# Patient Record
Sex: FEMALE | Race: WHITE
[De-identification: names, ages, dates, MRNs, and addresses within clinical notes are randomized per-mention and may not be internally consistent; named-entity substitution may affect disease eponyms.]

---

## 2019-07-23 ENCOUNTER — HOSPITAL ENCOUNTER (OUTPATIENT)
Dept: HOSPITAL 53 - M SDC | Age: 34
Discharge: HOME | End: 2019-07-23
Attending: OBSTETRICS & GYNECOLOGY
Payer: COMMERCIAL

## 2019-07-23 VITALS — SYSTOLIC BLOOD PRESSURE: 120 MMHG | DIASTOLIC BLOOD PRESSURE: 77 MMHG

## 2019-07-23 VITALS — BODY MASS INDEX: 21.34 KG/M2 | WEIGHT: 125 LBS | HEIGHT: 64 IN

## 2019-07-23 DIAGNOSIS — K58.8: ICD-10-CM

## 2019-07-23 DIAGNOSIS — F41.9: ICD-10-CM

## 2019-07-23 DIAGNOSIS — D06.9: Primary | ICD-10-CM

## 2019-07-23 LAB
B-HCG SERPL QL: NEGATIVE
HCT VFR BLD AUTO: 40 % (ref 36–47)
HGB BLD-MCNC: 12.7 G/DL (ref 12–15.5)
MCH RBC QN AUTO: 28.3 PG (ref 27–33)
MCHC RBC AUTO-ENTMCNC: 31.8 G/DL (ref 32–36.5)
MCV RBC AUTO: 89.1 FL (ref 80–96)
PLATELET # BLD AUTO: 238 10^3/UL (ref 150–450)
RBC # BLD AUTO: 4.49 10^6/UL (ref 4–5.4)
WBC # BLD AUTO: 4.1 10^3/UL (ref 4–10)

## 2019-07-23 PROCEDURE — 57522 CONIZATION OF CERVIX: CPT

## 2019-07-23 PROCEDURE — 36415 COLL VENOUS BLD VENIPUNCTURE: CPT

## 2019-07-23 PROCEDURE — 84703 CHORIONIC GONADOTROPIN ASSAY: CPT

## 2019-07-23 PROCEDURE — 85027 COMPLETE CBC AUTOMATED: CPT

## 2019-07-23 PROCEDURE — 88307 TISSUE EXAM BY PATHOLOGIST: CPT

## 2019-07-25 NOTE — RO
DATE OF PROCEDURE:  07/23/2019

 

PREOPERATIVE DIAGNOSIS:  Cervical dysplasia.

 

POSTOPERATIVE DIAGNOSIS:  Cervical dysplasia.

 

PROCEDURE:  LOOP electrosurgical excision procedure.

 

SURGEON:  Dr. Royal Bañuelos

 

ASSISTANT:  None.

 

ANESTHESIA:  MAC.

 

FLUIDS:  600 mL of lactated Ringers (LR).

 

URINE OUTPUT:  150 mL via straight cath.

 

ESTIMATED BLOOD LOSS:  1 mL.

 

COMPLICATIONS:  None.

 

ANTIBIOTICS:  None indicated.

 

DETAILED PROCEDURE DESCRIPTION:  The risks, benefits, indications and

alternatives of the procedure were reviewed with the patient and informed 
consent

was obtained.  The patient was taken to the operating room where IV sedation was

obtained without difficulty.  The patient was then placed in lithotomy position

using Darrell stirrups.  The patient was then prepped and draped in the usual

sterile fashion and the bladder was drained using an in and out catheter.

 

A sterile Graves speculum was then placed into the vagina and the cervix was

visualized.  A paracervical block was then performed using approximately 10 mL 
of

lidocaine with epinephrine.  Lugol solution was then copiously applied to the

cervix and highlighted a lesion from the 10 o'clock to 1 o'clock position of the

cervix.

 

The LEEP device was then set to 60/60 blend and activated.  The looped

electrocautery wire was then passed across the external cervical os in two

passes.  Collection of an adequate tissue sample was collected and it was sent 
to

pathology for review.

 

Superficial electrocoagulation of the cervical stroma was then performed and

excellent hemostasis was achieved.  All instruments were then removed from the

patient's vagina.  At the completion of the case, the sponge, instrument and

needle counts were correct times two.  The patient tolerated the procedure well

and was discharged to home in stable condition.

[facility]

## 2019-09-03 ENCOUNTER — HOSPITAL ENCOUNTER (OUTPATIENT)
Dept: HOSPITAL 53 - M OPP | Age: 34
Discharge: HOME | End: 2019-09-03
Attending: INTERNAL MEDICINE
Payer: COMMERCIAL

## 2019-09-03 VITALS — SYSTOLIC BLOOD PRESSURE: 109 MMHG | DIASTOLIC BLOOD PRESSURE: 73 MMHG

## 2019-09-03 VITALS — WEIGHT: 125.4 LBS | BODY MASS INDEX: 21.41 KG/M2 | HEIGHT: 64 IN

## 2019-09-03 DIAGNOSIS — K62.5: ICD-10-CM

## 2019-09-03 DIAGNOSIS — Z79.899: ICD-10-CM

## 2019-09-03 DIAGNOSIS — K64.0: Primary | ICD-10-CM

## 2019-09-03 NOTE — ROOR
________________________________________________________________________________

Patient Name: Jo Russo          Procedure Date: 9/3/2019 1:05 PM

MRN: [id number]                          Account Number: [id number]

YOB: 1985               Age: 34

Room: [facility]                            Gender: Female

Note Status: Finalized                 

________________________________________________________________________________

 

Procedure:           Total Colonoscopy to Cecum

Indications:         Rectal bleeding

Providers:           Rey Carlson MD

Referring MD:        RITA SALINAS MD

Requesting Provider: 

Medicines:           Monitored Anesthesia Care

Complications:       No immediate complications.

________________________________________________________________________________

Procedure:           Pre-Anesthesia Assessment:

                     - The heart rate, respiratory rate, oxygen saturations, 

                     blood pressure, adequacy of pulmonary ventilation, and 

                     response to care were monitored throughout the procedure.

                     The Colonoscope was introduced through the anus and 

                     advanced to the cecum, identified by appendiceal orifice 

                     and ileocecal valve. The colonoscopy was performed 

                     without difficulty. The patient tolerated the procedure 

                     well. The quality of the bowel preparation was excellent.

                                                                                

Findings:

     The perianal and digital rectal examinations were normal.

     Non-bleeding internal hemorrhoids were found during retroflexion. The 

     hemorrhoids were small and Grade I (internal hemorrhoids that do not 

     prolapse).

     No other significant abnormalities were identified in a careful 

     examination of the remainder of the colon.

     The exam was otherwise without abnormality on direct and retroflexion 

     views.

                                                                                

Impression:          - Non-bleeding internal hemorrhoids.

                     - The examination was otherwise normal on direct and 

                     retroflexion views.

                     - No specimens collected.

                     - The exam was otherwise normal to the cecum.

Recommendation:      - Patient has a contact number available for emergencies. 

                     The signs and symptoms of potential delayed complications 

                     were discussed with the patient. Return to normal 

                     activities tomorrow. Written discharge instructions were 

                     provided to the patient.

                     - High fiber diet.

                     - Discharge patient to home.

                     - Continue present medications.

                     - Repeat colonoscopy at age 50 for screening purposes.

                     - Return to referring physician.

                     - The findings and recommendations were discussed with 

                     the patient's family.

                                                                                

 

Rey Carlson MD

____________________

Rey Carlson MD

9/3/2019 1:24:15 PM

Electronically signed by Rey Carlson MD

Number of Addenda: 0

 

Note Initiated On: 9/3/2019 1:05 PM

Estimated Blood Loss:

     Estimated blood loss: none.